# Patient Record
Sex: FEMALE | Race: OTHER | HISPANIC OR LATINO | URBAN - METROPOLITAN AREA
[De-identification: names, ages, dates, MRNs, and addresses within clinical notes are randomized per-mention and may not be internally consistent; named-entity substitution may affect disease eponyms.]

---

## 2020-08-15 ENCOUNTER — INPATIENT (INPATIENT)
Facility: HOSPITAL | Age: 65
LOS: 1 days | Discharge: HOME | DRG: 313 | End: 2020-08-17
Attending: INTERNAL MEDICINE | Admitting: INTERNAL MEDICINE
Payer: MEDICARE

## 2020-08-15 VITALS
OXYGEN SATURATION: 95 % | TEMPERATURE: 98 F | HEART RATE: 89 BPM | DIASTOLIC BLOOD PRESSURE: 91 MMHG | WEIGHT: 169.98 LBS | SYSTOLIC BLOOD PRESSURE: 157 MMHG | HEIGHT: 57 IN | RESPIRATION RATE: 18 BRPM

## 2020-08-15 DIAGNOSIS — I20.8 OTHER FORMS OF ANGINA PECTORIS: ICD-10-CM

## 2020-08-15 DIAGNOSIS — I10 ESSENTIAL (PRIMARY) HYPERTENSION: ICD-10-CM

## 2020-08-15 DIAGNOSIS — R60.0 LOCALIZED EDEMA: ICD-10-CM

## 2020-08-15 DIAGNOSIS — E78.00 PURE HYPERCHOLESTEROLEMIA, UNSPECIFIED: ICD-10-CM

## 2020-08-15 DIAGNOSIS — Z29.9 ENCOUNTER FOR PROPHYLACTIC MEASURES, UNSPECIFIED: ICD-10-CM

## 2020-08-15 DIAGNOSIS — R07.9 CHEST PAIN, UNSPECIFIED: ICD-10-CM

## 2020-08-15 LAB
24R-OH-CALCIDIOL SERPL-MCNC: 22.4 NG/ML — LOW (ref 30–80)
A1C WITH ESTIMATED AVERAGE GLUCOSE RESULT: 5.3 % — SIGNIFICANT CHANGE UP (ref 4–5.6)
ALBUMIN SERPL ELPH-MCNC: 3.7 G/DL — SIGNIFICANT CHANGE UP (ref 3.5–5)
ALP SERPL-CCNC: 116 U/L — SIGNIFICANT CHANGE UP (ref 40–120)
ALT FLD-CCNC: 32 U/L DA — SIGNIFICANT CHANGE UP (ref 10–60)
ANION GAP SERPL CALC-SCNC: 7 MMOL/L — SIGNIFICANT CHANGE UP (ref 5–17)
AST SERPL-CCNC: 24 U/L — SIGNIFICANT CHANGE UP (ref 10–40)
BASOPHILS # BLD AUTO: 0.05 K/UL — SIGNIFICANT CHANGE UP (ref 0–0.2)
BASOPHILS NFR BLD AUTO: 0.5 % — SIGNIFICANT CHANGE UP (ref 0–2)
BILIRUB SERPL-MCNC: 0.5 MG/DL — SIGNIFICANT CHANGE UP (ref 0.2–1.2)
BUN SERPL-MCNC: 12 MG/DL — SIGNIFICANT CHANGE UP (ref 7–18)
CALCIUM SERPL-MCNC: 9.1 MG/DL — SIGNIFICANT CHANGE UP (ref 8.4–10.5)
CHLORIDE SERPL-SCNC: 109 MMOL/L — HIGH (ref 96–108)
CHOLEST SERPL-MCNC: 181 MG/DL — SIGNIFICANT CHANGE UP (ref 10–199)
CK MB BLD-MCNC: 2 % — SIGNIFICANT CHANGE UP (ref 0–3.5)
CK MB CFR SERPL CALC: 2.5 NG/ML — SIGNIFICANT CHANGE UP (ref 0–3.6)
CK SERPL-CCNC: 127 U/L — SIGNIFICANT CHANGE UP (ref 21–215)
CO2 SERPL-SCNC: 26 MMOL/L — SIGNIFICANT CHANGE UP (ref 22–31)
CREAT SERPL-MCNC: 0.72 MG/DL — SIGNIFICANT CHANGE UP (ref 0.5–1.3)
D DIMER BLD IA.RAPID-MCNC: <150 NG/ML DDU — SIGNIFICANT CHANGE UP
EOSINOPHIL # BLD AUTO: 0.19 K/UL — SIGNIFICANT CHANGE UP (ref 0–0.5)
EOSINOPHIL NFR BLD AUTO: 1.9 % — SIGNIFICANT CHANGE UP (ref 0–6)
ESTIMATED AVERAGE GLUCOSE: 105 MG/DL — SIGNIFICANT CHANGE UP (ref 68–114)
GLUCOSE SERPL-MCNC: 106 MG/DL — HIGH (ref 70–99)
HCT VFR BLD CALC: 42.9 % — SIGNIFICANT CHANGE UP (ref 34.5–45)
HDLC SERPL-MCNC: 62 MG/DL — SIGNIFICANT CHANGE UP
HGB BLD-MCNC: 14.5 G/DL — SIGNIFICANT CHANGE UP (ref 11.5–15.5)
IMM GRANULOCYTES NFR BLD AUTO: 0.4 % — SIGNIFICANT CHANGE UP (ref 0–1.5)
LIPID PNL WITH DIRECT LDL SERPL: 84 MG/DL — SIGNIFICANT CHANGE UP
LYMPHOCYTES # BLD AUTO: 2.37 K/UL — SIGNIFICANT CHANGE UP (ref 1–3.3)
LYMPHOCYTES # BLD AUTO: 24 % — SIGNIFICANT CHANGE UP (ref 13–44)
MCHC RBC-ENTMCNC: 31.3 PG — SIGNIFICANT CHANGE UP (ref 27–34)
MCHC RBC-ENTMCNC: 33.8 GM/DL — SIGNIFICANT CHANGE UP (ref 32–36)
MCV RBC AUTO: 92.7 FL — SIGNIFICANT CHANGE UP (ref 80–100)
MONOCYTES # BLD AUTO: 0.6 K/UL — SIGNIFICANT CHANGE UP (ref 0–0.9)
MONOCYTES NFR BLD AUTO: 6.1 % — SIGNIFICANT CHANGE UP (ref 2–14)
NEUTROPHILS # BLD AUTO: 6.61 K/UL — SIGNIFICANT CHANGE UP (ref 1.8–7.4)
NEUTROPHILS NFR BLD AUTO: 67.1 % — SIGNIFICANT CHANGE UP (ref 43–77)
NRBC # BLD: 0 /100 WBCS — SIGNIFICANT CHANGE UP (ref 0–0)
PLATELET # BLD AUTO: 292 K/UL — SIGNIFICANT CHANGE UP (ref 150–400)
POTASSIUM SERPL-MCNC: 3.8 MMOL/L — SIGNIFICANT CHANGE UP (ref 3.5–5.3)
POTASSIUM SERPL-SCNC: 3.8 MMOL/L — SIGNIFICANT CHANGE UP (ref 3.5–5.3)
PROT SERPL-MCNC: 7.9 G/DL — SIGNIFICANT CHANGE UP (ref 6–8.3)
RBC # BLD: 4.63 M/UL — SIGNIFICANT CHANGE UP (ref 3.8–5.2)
RBC # FLD: 12.2 % — SIGNIFICANT CHANGE UP (ref 10.3–14.5)
SARS-COV-2 IGG SERPL QL IA: POSITIVE
SARS-COV-2 IGM SERPL IA-ACNC: 102 INDEX — HIGH
SARS-COV-2 RNA SPEC QL NAA+PROBE: SIGNIFICANT CHANGE UP
SODIUM SERPL-SCNC: 142 MMOL/L — SIGNIFICANT CHANGE UP (ref 135–145)
TOTAL CHOLESTEROL/HDL RATIO MEASUREMENT: 2.9 RATIO — LOW (ref 3.3–7.1)
TRIGL SERPL-MCNC: 177 MG/DL — HIGH (ref 10–149)
TROPONIN I SERPL-MCNC: <0.015 NG/ML — SIGNIFICANT CHANGE UP (ref 0–0.04)
TSH SERPL-MCNC: 2.2 UU/ML — SIGNIFICANT CHANGE UP (ref 0.34–4.82)
VIT B12 SERPL-MCNC: 703 PG/ML — SIGNIFICANT CHANGE UP (ref 232–1245)
WBC # BLD: 9.86 K/UL — SIGNIFICANT CHANGE UP (ref 3.8–10.5)
WBC # FLD AUTO: 9.86 K/UL — SIGNIFICANT CHANGE UP (ref 3.8–10.5)

## 2020-08-15 PROCEDURE — 93010 ELECTROCARDIOGRAM REPORT: CPT | Mod: 76

## 2020-08-15 PROCEDURE — 99285 EMERGENCY DEPT VISIT HI MDM: CPT

## 2020-08-15 PROCEDURE — 93306 TTE W/DOPPLER COMPLETE: CPT | Mod: 26

## 2020-08-15 PROCEDURE — 71045 X-RAY EXAM CHEST 1 VIEW: CPT | Mod: 26

## 2020-08-15 PROCEDURE — 93970 EXTREMITY STUDY: CPT | Mod: 26

## 2020-08-15 RX ORDER — ASPIRIN/CALCIUM CARB/MAGNESIUM 324 MG
81 TABLET ORAL ONCE
Refills: 0 | Status: COMPLETED | OUTPATIENT
Start: 2020-08-15 | End: 2020-08-15

## 2020-08-15 RX ORDER — METOPROLOL TARTRATE 50 MG
12.5 TABLET ORAL
Refills: 0 | Status: DISCONTINUED | OUTPATIENT
Start: 2020-08-15 | End: 2020-08-17

## 2020-08-15 RX ORDER — ATORVASTATIN CALCIUM 80 MG/1
1 TABLET, FILM COATED ORAL
Qty: 0 | Refills: 0 | DISCHARGE

## 2020-08-15 RX ORDER — ASPIRIN/CALCIUM CARB/MAGNESIUM 324 MG
81 TABLET ORAL DAILY
Refills: 0 | Status: DISCONTINUED | OUTPATIENT
Start: 2020-08-16 | End: 2020-08-17

## 2020-08-15 RX ORDER — ERGOCALCIFEROL 1.25 MG/1
1 CAPSULE ORAL
Qty: 0 | Refills: 0 | DISCHARGE

## 2020-08-15 RX ORDER — ATORVASTATIN CALCIUM 80 MG/1
40 TABLET, FILM COATED ORAL AT BEDTIME
Refills: 0 | Status: DISCONTINUED | OUTPATIENT
Start: 2020-08-15 | End: 2020-08-17

## 2020-08-15 RX ORDER — LOSARTAN POTASSIUM 100 MG/1
1 TABLET, FILM COATED ORAL
Qty: 0 | Refills: 0 | DISCHARGE

## 2020-08-15 RX ORDER — LOSARTAN POTASSIUM 100 MG/1
25 TABLET, FILM COATED ORAL DAILY
Refills: 0 | Status: DISCONTINUED | OUTPATIENT
Start: 2020-08-15 | End: 2020-08-17

## 2020-08-15 RX ORDER — AMLODIPINE BESYLATE 2.5 MG/1
2.5 TABLET ORAL DAILY
Refills: 0 | Status: DISCONTINUED | OUTPATIENT
Start: 2020-08-15 | End: 2020-08-17

## 2020-08-15 RX ORDER — ENOXAPARIN SODIUM 100 MG/ML
40 INJECTION SUBCUTANEOUS DAILY
Refills: 0 | Status: DISCONTINUED | OUTPATIENT
Start: 2020-08-15 | End: 2020-08-17

## 2020-08-15 RX ORDER — ATORVASTATIN CALCIUM 80 MG/1
10 TABLET, FILM COATED ORAL AT BEDTIME
Refills: 0 | Status: DISCONTINUED | OUTPATIENT
Start: 2020-08-15 | End: 2020-08-15

## 2020-08-15 RX ORDER — ACETAMINOPHEN 500 MG
650 TABLET ORAL EVERY 6 HOURS
Refills: 0 | Status: DISCONTINUED | OUTPATIENT
Start: 2020-08-15 | End: 2020-08-17

## 2020-08-15 RX ORDER — AMLODIPINE BESYLATE 2.5 MG/1
1 TABLET ORAL
Qty: 0 | Refills: 0 | DISCHARGE

## 2020-08-15 RX ADMIN — Medication 81 MILLIGRAM(S): at 05:43

## 2020-08-15 RX ADMIN — Medication 650 MILLIGRAM(S): at 20:20

## 2020-08-15 RX ADMIN — Medication 12.5 MILLIGRAM(S): at 17:14

## 2020-08-15 RX ADMIN — Medication 650 MILLIGRAM(S): at 19:33

## 2020-08-15 RX ADMIN — LOSARTAN POTASSIUM 25 MILLIGRAM(S): 100 TABLET, FILM COATED ORAL at 06:18

## 2020-08-15 RX ADMIN — ATORVASTATIN CALCIUM 40 MILLIGRAM(S): 80 TABLET, FILM COATED ORAL at 21:55

## 2020-08-15 RX ADMIN — ENOXAPARIN SODIUM 40 MILLIGRAM(S): 100 INJECTION SUBCUTANEOUS at 12:17

## 2020-08-15 NOTE — H&P ADULT - NSHPPHYSICALEXAM_GEN_ALL_CORE
Vital Signs Last 24 Hrs  T(C): 36.4 (15 Aug 2020 06:14), Max: 36.8 (15 Aug 2020 02:30)  T(F): 97.5 (15 Aug 2020 06:14), Max: 98.3 (15 Aug 2020 02:30)  HR: 79 (15 Aug 2020 06:14) (79 - 89)  BP: 148/71 (15 Aug 2020 06:14) (148/71 - 157/91)  BP(mean): --  RR: 18 (15 Aug 2020 06:14) (18 - 18)  SpO2: 96% (15 Aug 2020 06:14) (95% - 96%)      PHYSICAL EXAM:  GENERAL: NAD, well-groomed, well-developed  HEAD:  Atraumatic, Normocephalic  EYES: PERRLA, conjunctiva and sclera clear  ENMT: No tonsillar erythema, exudates, or enlargement; Moist mucous membranes, Good dentition, No lesions  NECK: Supple, No JVD, Normal thyroid  NERVOUS SYSTEM:  Alert & Oriented X3, Good concentration; Motor Strength 5/5 B/L upper and lower extremities  CHEST/LUNG: Clear to percussion bilaterally; (+) b/l crackles, No rhonchi, wheezing, or rubs  HEART: Regular rate and rhythm; No murmurs, rubs, or gallops  ABDOMEN: Obese, Soft, Nontender, Bowel sounds present  EXTREMITIES:  2+ Peripheral Pulses, No clubbing, cyanosis, (+) mild non pitting edema  LYMPH: No lymphadenopathy noted  SKIN: No rashes or lesions

## 2020-08-15 NOTE — H&P ADULT - ASSESSMENT
64yo female from home, living with son, with PMH of HTN, HLD, R ear hearing impaired, no PSH presented to ED for chest pain and palpitation.    In ED:  EKG: low voltage  Chest pain is 5/10 intensity. Pt states pain is better after getting aspirin.    Pt was admitted for ACS work up.    **Please call pharmacy ([facility] [phone]) to confirm pt's home meds.Pt states she is taking 5 meds, but remember only 3 meds. 66yo female from home, living with son, with PMH of HTN, HLD, R ear hearing impaired, no PSH presented to ED for chest pain and palpitation.    In ED:  EKG: NSR, low voltage QRS  Chest pain is 5/10 intensity. Pt states pain is better after getting aspirin.    Pt was admitted for ACS work up.    **Please call pharmacy ([facility] [phone]) to confirm pt's home meds. Pt states she is taking 5 meds, but remember only 3 meds. 64yo Luxembourgish speaking female from home, living with son, with PMH of HTN, HLD, arthritis, R ear hearing impaired, no PSH presented to ED for chest pain and palpitation.    In ED:  EKG: NSR, low voltage QRS  Chest pain is 5/10 intensity. Pt states pain is better after getting aspirin.    Pt was admitted for ACS work up.    **Please call pharmacy ([facility] [phone]) to confirm pt's home meds. Pt states she is taking 5 meds, but remember only 3 meds.

## 2020-08-15 NOTE — H&P ADULT - ATTENDING COMMENTS
66yo Khmer speaking female from home, living with son, with PMH of HTN, HLD, arthritis, R ear hearing impaird, no PSH presented to ED for chest pain and palpitation. On Fri (a day ago), pt had abd pain and not feeling well, then chest pain and palpitation (pt states " tachycardia" ) started. Pain started 1 day ago, throbbing, dull pain, 9/10 intensity, no radiation, on and off, worse with exertion. Pt also had mild headache and she feels like getting more swollen these days. She gained 10lbs in 2 months.  Pt denied SOB, acid reflex, n/v, or any other symptoms.    In ED:  EKG: NSR, low voltage QRS  Chest pain is 5/10 intensity. Pt states pain is better after getting aspirin.    assessment   --- chest pain, uncontrolled htn  r/o acs, h/o HTN, HLD, arthritis, R ear hearing impaired    plan  --  adm to tele, acs protocol, lopressor, aspirin, statin, cont preadmit home meds, gi and dvt profilaxis  cbc, bmp, mg, phos, lipid, tsh, ce q8 x3    echo    cardio cons

## 2020-08-15 NOTE — H&P ADULT - PROBLEM SELECTOR PLAN 4
IMPROVE VTE Individual Risk Assessment  RISK                                                                Points  [  ] Previous VTE                                                  3  [  ] Thrombophilia                                               2  [  ] Lower limb paralysis                                      2        (unable to hold up >15 seconds)    [  ] Current Cancer                                              2         (within 6 months)  [ x ] Immobilization > 24 hrs                                1  [  ] ICU/CCU stay > 24 hours                              1  [x  ] Age > 60                                                      1  IMPROVE VTE Score __2_______  Lovenox for DVT ppx c/w statin  f/u lipid panel

## 2020-08-15 NOTE — H&P ADULT - PROBLEM SELECTOR PLAN 2
Pt is on losartan 25mg and amlodipine pt doesn't remember the dosage  - c/w losartan 25mg and low dose amolodipine  Please call pharmacy to clarify the meds Pt has mild leg edema, has tenderness on palpation  - f/u TTE  - f/u doppler b/l LE

## 2020-08-15 NOTE — CHART NOTE - NSCHARTNOTEFT_GEN_A_CORE
Pt 66 y/o Ghanaian speaking female from home, living with son, with PMH of HTN, HLD, arthritis, R ear hearing impaired, no PSH presented to ED for chest pain and palpitation admitted for R/o ACS work up.  Cardiac enzyme and lower extremities US neg so far.  Pt s/o to floor covering resident. Pt 64 y/o Nigerien speaking female from home, living with son, with PMH of HTN, HLD, arthritis, R ear hearing impaired, no PSH presented to ED for chest pain and palpitation admitted for R/o ACS work up.  Cardiac enzyme and lower extremities US neg so far.  Pt s/o to floor covering resident.    Outpt Med list updated.

## 2020-08-15 NOTE — CONSULT NOTE ADULT - SUBJECTIVE AND OBJECTIVE BOX
CHIEF COMPLAINT:Patient is a 65y old  Female who presents with a chief complaint of Chest pain.      HPI:Pt is a 65 yr old female with PMHX of HTN,LIPID D/O who presents to ER with intermittent chest pain and sob on exertion, no pnd or orthopnea.Currently,pt is chest pain free at present time..      PAST MEDICAL & SURGICAL HISTORY:  Hearing impairment  High cholesterol  Hypertension, unspecified type      MEDICATIONS  (STANDING):  amLODIPine   Tablet 2.5 milliGRAM(s) Oral daily  atorvastatin 40 milliGRAM(s) Oral at bedtime  enoxaparin Injectable 40 milliGRAM(s) SubCutaneous daily  losartan 25 milliGRAM(s) Oral daily  metoprolol tartrate 12.5 milliGRAM(s) Oral two times a day    MEDICATIONS  (PRN):      FAMILY HISTORY:No hx of CAD      SOCIAL HISTORY:    [x ] Non-smoker    [x ] Alcohol-denies    Allergies    No Known Allergies    Intolerances    	    REVIEW OF SYSTEMS:  CONSTITUTIONAL: No fever, weight loss, or fatigue  EYES: No eye pain, visual disturbances, or discharge  ENT:  No difficulty hearing, tinnitus, vertigo; No sinus or throat pain  NECK: No pain or stiffness  RESPIRATORY: No cough, wheezing, chills or hemoptysis; + Shortness of Breath  CARDIOVASCULAR: + chest pain, No palpitations, passing out, dizziness, or leg swelling  GASTROINTESTINAL: No abdominal or epigastric pain. No nausea, vomiting, or hematemesis; No diarrhea or constipation. No melena or hematochezia.  GENITOURINARY: No dysuria, frequency, hematuria, or incontinence  NEUROLOGICAL: No headaches, memory loss, loss of strength, numbness, or tremors  SKIN: No itching, burning, rashes, or lesions   LYMPH Nodes: No enlarged glands  ENDOCRINE: No heat or cold intolerance; No hair loss  MUSCULOSKELETAL: No joint pain or swelling; No muscle, back, or extremity pain  PSYCHIATRIC: No depression, anxiety, mood swings, or difficulty sleeping  HEME/LYMPH: No easy bruising, or bleeding gums  ALLERGY AND IMMUNOLOGIC: No hives or eczema	        PHYSICAL EXAM:  T(C): 36.7 (08-15-20 @ 07:42), Max: 36.8 (08-15-20 @ 02:30)  HR: 68 (08-15-20 @ 07:42) (68 - 89)  BP: 127/61 (08-15-20 @ 07:42) (127/61 - 157/91)  RR: 16 (08-15-20 @ 07:42) (16 - 18)  SpO2: 98% (08-15-20 @ 07:42) (95% - 98%)  Wt(kg): --  I&O's Summary      Appearance: Normal	  HEENT:   Normal oral mucosa, PERRL, EOMI	  Lymphatic: No lymphadenopathy  Cardiovascular: Normal S1 S2, No JVD, No murmurs, No edema  Respiratory: Lungs clear to auscultation	  Psychiatry: A & O x 3, Mood & affect appropriate  Gastrointestinal:  Soft, Non-tender, + BS	  Skin: No rashes, No ecchymoses, No cyanosis	  Neurologic: Non-focal  Extremities: Normal range of motion, No clubbing, cyanosis or edema  Vascular: Peripheral pulses palpable 2+ bilaterally    	    ECG:  	NSR with IRBBB  	  	  LABS:	 	      CARDIAC MARKERS ( 15 Aug 2020 03:08 )  <0.015 ng/mL / x     / x     / x     / x                             14.5   9.86  )-----------( 292      ( 15 Aug 2020 03:08 )             42.9     08-15    142  |  109<H>  |  12  ----------------------------<  106<H>  3.8   |  26  |  0.72    Ca    9.1      15 Aug 2020 03:08  Phos  3.5     08-15  Mg     2.4     08-15    TPro  7.9  /  Alb  3.7  /  TBili  0.5  /  DBili  x   /  AST  24  /  ALT  32  /  AlkPhos  116  08-15    EXAM:  XR CHEST AP OR PA 1V                            PROCEDURE DATE:  08/15/2020          INTERPRETATION:  CLINICAL INFORMATION: Chest pain.    A frontal view of the chest was obtained.    Comparison: None.    IMPRESSION:    The mediastinal cardiac silhouette is unremarkable.    The lungs are clear.    No acute osseous finding.

## 2020-08-15 NOTE — H&P ADULT - NSHPREVIEWOFSYSTEMS_GEN_ALL_CORE
REVIEW OF SYSTEMS:  CONSTITUTIONAL: No fever, weight loss, or fatigue  EYES: No eye pain, visual disturbances, or discharge  ENMT:  No difficulty hearing, tinnitus, vertigo; No sinus or throat pain  NECK: No pain or stiffness  BREASTS: No pain, masses, or nipple discharge  RESPIRATORY: No cough, wheezing, chills or hemoptysis; No shortness of breath  CARDIOVASCULAR: (+) chest pain, palpitations, no dizziness, (+)mild  leg swelling  GASTROINTESTINAL: (+) had abdominal  pain. No nausea, vomiting, or hematemesis; No diarrhea or constipation. No melena or hematochezia.  GENITOURINARY: No dysuria, frequency, hematuria, or incontinence  NEUROLOGICAL: (+) mild headaches, no memory loss, loss of strength, numbness, or tremors  SKIN: No itching, burning, rashes, or lesions   LYMPH NODES: No enlarged glands  ENDOCRINE: No heat or cold intolerance; No hair loss  MUSCULOSKELETAL: No joint pain or swelling; No muscle, back, or extremity pain  HEME/LYMPH: No easy bruising, or bleeding gums  ALLERY AND IMMUNOLOGIC: No hives or eczema

## 2020-08-15 NOTE — H&P ADULT - PROBLEM SELECTOR PLAN 5
IMPROVE VTE Individual Risk Assessment  RISK                                                                Points  [  ] Previous VTE                                                  3  [  ] Thrombophilia                                               2  [  ] Lower limb paralysis                                      2        (unable to hold up >15 seconds)    [  ] Current Cancer                                              2         (within 6 months)  [ x ] Immobilization > 24 hrs                                1  [  ] ICU/CCU stay > 24 hours                              1  [x  ] Age > 60                                                      1  IMPROVE VTE Score __2_______  Lovenox for DVT ppx

## 2020-08-15 NOTE — ED PROVIDER NOTE - OBJECTIVE STATEMENT
Patient reports intermittent exertional chest pain starting in the afternoon on 8/14. No fever, sob, cough, ap, n/v/d, diaphoresis. No pain right now. [employer] used.

## 2020-08-15 NOTE — H&P ADULT - NSHPSOCIALHISTORY_GEN_ALL_CORE
Pt lives at home with her son, not working due to hearing impairment  Pt denied smoking or illicit drug use. Pt drinks alcohol occasionally.

## 2020-08-15 NOTE — ED ADULT NURSE NOTE - CHPI ED NUR SYMPTOMS NEG
no vomiting/no dizziness/no chills/no decreased eating/drinking/no tingling/no nausea/no fever/no weakness

## 2020-08-15 NOTE — H&P ADULT - HISTORY OF PRESENT ILLNESS
66yo female from home, living with son, with PMH of HTN, HLD, R ear hearing impaird, no PSH presented to ED for chest pain and palpitation. On Fri (a day ago), pt had abd pain and not feeling well, then chest pain and palpitation (pt states " tachycardia" ) started. Pain started 1 day ago, throbbing, dull pain, 9/10 intensity, no radiation, on and off, worse with exertion. Pt also had mild headache and she feels like getting more swollen these days. She gained 10lbs in 2 months.  Pt denied SOB, acid reflex, n/v, or any other symptoms.    In ED:  EKG: low voltage  Chest pain is 5/10 intensity. Pt states pain is better after getting aspirin. 64yo female from home, living with son, with PMH of HTN, HLD, R ear hearing impaird, no PSH presented to ED for chest pain and palpitation. On Fri (a day ago), pt had abd pain and not feeling well, then chest pain and palpitation (pt states " tachycardia" ) started. Pain started 1 day ago, throbbing, dull pain, 9/10 intensity, no radiation, on and off, worse with exertion. Pt also had mild headache and she feels like getting more swollen these days. She gained 10lbs in 2 months.  Pt denied SOB, acid reflex, n/v, or any other symptoms.    In ED:  EKG: NSR, low voltage QRS  Chest pain is 5/10 intensity. Pt states pain is better after getting aspirin. 64yo Mongolian speaking female from home, living with son, with PMH of HTN, HLD, arthritis, R ear hearing impaird, no PSH presented to ED for chest pain and palpitation. On Fri (a day ago), pt had abd pain and not feeling well, then chest pain and palpitation (pt states " tachycardia" ) started. Pain started 1 day ago, throbbing, dull pain, 9/10 intensity, no radiation, on and off, worse with exertion. Pt also had mild headache and she feels like getting more swollen these days. She gained 10lbs in 2 months.  Pt denied SOB, acid reflex, n/v, or any other symptoms.    In ED:  EKG: NSR, low voltage QRS  Chest pain is 5/10 intensity. Pt states pain is better after getting aspirin. 64yo Kinyarwanda speaking female from home, living with son, with PMH of HTN, HLD, arthritis, R ear hearing impaired, no PSH presented to ED for chest pain and palpitation.    In ED:  EKG: NSR, low voltage QRS  Chest pain is 5/10 intensity. Pt states pain is better after getting aspirin.

## 2020-08-15 NOTE — CONSULT NOTE ADULT - ASSESSMENT
65 yr old female with PMHX of HTN,LIPID D/O who presents to ER with intermittent chest pain and sob on exertion.  1.Tele monitoring.  2.Echocardiogram.  3.Stress test-r/o ischemia.  4.HTN-cont bp medication.  5.Lipid d/o-statin.  6.GI and DVT prophylaxis.

## 2020-08-15 NOTE — H&P ADULT - PROBLEM SELECTOR PLAN 3
c/w statin  f/u lipid panel Pt is on losartan 25mg and amlodipine pt doesn't remember the dosage  - c/w losartan 25mg and low dose amolodipine  Please call pharmacy to clarify the meds

## 2020-08-15 NOTE — H&P ADULT - PROBLEM SELECTOR PLAN 1
- Patient p/w exertional chest pain   - EKG   - Pt never had cardiac work up   - T1 negative, T2 @ 9am  , T3 @ 3pm  - ELVA score: 1   - c/w ASA 81 mg, beta blocker and High dose statin   - Follow up Lipid profile  - No coffee or tea for possible stress test  - On Tele.  - f/u TTE  ** Cardiologist Consulted Dr. Pimentel - Patient p/w exertional chest pain   - EKG   - Pt never had cardiac work up   - T1 negative, f/u T2 @ 9am  , T3 @ 3pm  - ELVA score: 1   - c/w ASA 81 mg, beta blocker and High dose statin   - Follow up Lipid profile  - No coffee or tea for possible stress test  - On Tele.  - f/u TTE  - f/u D-dimer   ** Cardiologist Consulted Dr. Pimentel

## 2020-08-16 LAB
ANION GAP SERPL CALC-SCNC: 7 MMOL/L — SIGNIFICANT CHANGE UP (ref 5–17)
BASOPHILS # BLD AUTO: 0.06 K/UL — SIGNIFICANT CHANGE UP (ref 0–0.2)
BASOPHILS NFR BLD AUTO: 0.8 % — SIGNIFICANT CHANGE UP (ref 0–2)
BUN SERPL-MCNC: 24 MG/DL — HIGH (ref 7–18)
CALCIUM SERPL-MCNC: 9.3 MG/DL — SIGNIFICANT CHANGE UP (ref 8.4–10.5)
CHLORIDE SERPL-SCNC: 107 MMOL/L — SIGNIFICANT CHANGE UP (ref 96–108)
CO2 SERPL-SCNC: 26 MMOL/L — SIGNIFICANT CHANGE UP (ref 22–31)
CREAT SERPL-MCNC: 0.82 MG/DL — SIGNIFICANT CHANGE UP (ref 0.5–1.3)
EOSINOPHIL # BLD AUTO: 0.22 K/UL — SIGNIFICANT CHANGE UP (ref 0–0.5)
EOSINOPHIL NFR BLD AUTO: 3 % — SIGNIFICANT CHANGE UP (ref 0–6)
GLUCOSE SERPL-MCNC: 91 MG/DL — SIGNIFICANT CHANGE UP (ref 70–99)
HCT VFR BLD CALC: 44.9 % — SIGNIFICANT CHANGE UP (ref 34.5–45)
HCV AB S/CO SERPL IA: 0.26 S/CO — SIGNIFICANT CHANGE UP (ref 0–0.99)
HCV AB SERPL-IMP: SIGNIFICANT CHANGE UP
HGB BLD-MCNC: 15 G/DL — SIGNIFICANT CHANGE UP (ref 11.5–15.5)
IMM GRANULOCYTES NFR BLD AUTO: 0.4 % — SIGNIFICANT CHANGE UP (ref 0–1.5)
LYMPHOCYTES # BLD AUTO: 2.64 K/UL — SIGNIFICANT CHANGE UP (ref 1–3.3)
LYMPHOCYTES # BLD AUTO: 36.4 % — SIGNIFICANT CHANGE UP (ref 13–44)
MAGNESIUM SERPL-MCNC: 2.6 MG/DL — SIGNIFICANT CHANGE UP (ref 1.6–2.6)
MCHC RBC-ENTMCNC: 31.3 PG — SIGNIFICANT CHANGE UP (ref 27–34)
MCHC RBC-ENTMCNC: 33.4 GM/DL — SIGNIFICANT CHANGE UP (ref 32–36)
MCV RBC AUTO: 93.7 FL — SIGNIFICANT CHANGE UP (ref 80–100)
MONOCYTES # BLD AUTO: 0.63 K/UL — SIGNIFICANT CHANGE UP (ref 0–0.9)
MONOCYTES NFR BLD AUTO: 8.7 % — SIGNIFICANT CHANGE UP (ref 2–14)
NEUTROPHILS # BLD AUTO: 3.67 K/UL — SIGNIFICANT CHANGE UP (ref 1.8–7.4)
NEUTROPHILS NFR BLD AUTO: 50.7 % — SIGNIFICANT CHANGE UP (ref 43–77)
NRBC # BLD: 0 /100 WBCS — SIGNIFICANT CHANGE UP (ref 0–0)
PHOSPHATE SERPL-MCNC: 4.8 MG/DL — HIGH (ref 2.5–4.5)
PLATELET # BLD AUTO: 309 K/UL — SIGNIFICANT CHANGE UP (ref 150–400)
POTASSIUM SERPL-MCNC: 4.1 MMOL/L — SIGNIFICANT CHANGE UP (ref 3.5–5.3)
POTASSIUM SERPL-SCNC: 4.1 MMOL/L — SIGNIFICANT CHANGE UP (ref 3.5–5.3)
RBC # BLD: 4.79 M/UL — SIGNIFICANT CHANGE UP (ref 3.8–5.2)
RBC # FLD: 12.3 % — SIGNIFICANT CHANGE UP (ref 10.3–14.5)
SODIUM SERPL-SCNC: 140 MMOL/L — SIGNIFICANT CHANGE UP (ref 135–145)
WBC # BLD: 7.25 K/UL — SIGNIFICANT CHANGE UP (ref 3.8–10.5)
WBC # FLD AUTO: 7.25 K/UL — SIGNIFICANT CHANGE UP (ref 3.8–10.5)

## 2020-08-16 RX ORDER — CHOLECALCIFEROL (VITAMIN D3) 125 MCG
1000 CAPSULE ORAL DAILY
Refills: 0 | Status: DISCONTINUED | OUTPATIENT
Start: 2020-08-16 | End: 2020-08-17

## 2020-08-16 RX ADMIN — Medication 12.5 MILLIGRAM(S): at 06:22

## 2020-08-16 RX ADMIN — Medication 12.5 MILLIGRAM(S): at 17:08

## 2020-08-16 RX ADMIN — LOSARTAN POTASSIUM 25 MILLIGRAM(S): 100 TABLET, FILM COATED ORAL at 06:23

## 2020-08-16 RX ADMIN — Medication 650 MILLIGRAM(S): at 19:28

## 2020-08-16 RX ADMIN — ENOXAPARIN SODIUM 40 MILLIGRAM(S): 100 INJECTION SUBCUTANEOUS at 11:26

## 2020-08-16 RX ADMIN — ATORVASTATIN CALCIUM 40 MILLIGRAM(S): 80 TABLET, FILM COATED ORAL at 22:11

## 2020-08-16 RX ADMIN — Medication 81 MILLIGRAM(S): at 11:26

## 2020-08-16 RX ADMIN — Medication 1000 UNIT(S): at 22:11

## 2020-08-16 RX ADMIN — Medication 650 MILLIGRAM(S): at 20:09

## 2020-08-16 RX ADMIN — AMLODIPINE BESYLATE 2.5 MILLIGRAM(S): 2.5 TABLET ORAL at 06:23

## 2020-08-16 NOTE — PROGRESS NOTE ADULT - PROBLEM SELECTOR PLAN 3
- Pt is on losartan 25mg and amlodipine pt doesn't remember the dosage  - c/w losartan 25mg and low dose amolodipine  - need to verify with pharmacy to clarify the meds

## 2020-08-16 NOTE — PROGRESS NOTE ADULT - SUBJECTIVE AND OBJECTIVE BOX
Patient is a 65y old  Female who presents with a chief complaint of Chest pain (15 Aug 2020 09:26)    pt seen in icu [  ], reg med floor [   ], bed [  ], chair at bedside [   ], a+o x3 [  ], lethargic [  ],  nad [  ]    martin [  ], ngt [  ], peg [  ], et tube [  ], cent line [  ], picc line [  ]        Allergies    No Known Allergies        Vitals    T(F): 98.5 (08-16-20 @ 04:56), Max: 98.8 (08-15-20 @ 23:51)  HR: 62 (08-16-20 @ 04:56) (62 - 79)  BP: 116/73 (08-16-20 @ 04:56) (107/60 - 148/71)  RR: 18 (08-16-20 @ 04:56) (16 - 18)  SpO2: 97% (08-16-20 @ 04:56) (96% - 99%)  Wt(kg): --  CAPILLARY BLOOD GLUCOSE          Labs                          14.5   9.86  )-----------( 292      ( 15 Aug 2020 03:08 )             42.9       08-15    142  |  109<H>  |  12  ----------------------------<  106<H>  3.8   |  26  |  0.72    Ca    9.1      15 Aug 2020 03:08  Phos  3.5     08-15  Mg     2.4     08-15    TPro  7.9  /  Alb  3.7  /  TBili  0.5  /  DBili  x   /  AST  24  /  ALT  32  /  AlkPhos  116  08-15      CARDIAC MARKERS ( 15 Aug 2020 16:57 )  <0.015 ng/mL / x     / x     / x     / x      CARDIAC MARKERS ( 15 Aug 2020 09:52 )  <0.015 ng/mL / x     / 127 U/L / x     / 2.5 ng/mL  CARDIAC MARKERS ( 15 Aug 2020 03:08 )  <0.015 ng/mL / x     / x     / x     / x                Radiology Results      Meds    MEDICATIONS  (STANDING):  amLODIPine   Tablet 2.5 milliGRAM(s) Oral daily  aspirin enteric coated 81 milliGRAM(s) Oral daily  atorvastatin 40 milliGRAM(s) Oral at bedtime  enoxaparin Injectable 40 milliGRAM(s) SubCutaneous daily  losartan 25 milliGRAM(s) Oral daily  metoprolol tartrate 12.5 milliGRAM(s) Oral two times a day      MEDICATIONS  (PRN):  acetaminophen   Tablet .. 650 milliGRAM(s) Oral every 6 hours PRN Mild Pain (1 - 3), Moderate Pain (4 - 6)      Physical Exam    Neuro :  no focal deficits  Respiratory: CTA B/L  CV: RRR, S1S2, no murmurs,   Abdominal: Soft, NT, ND +BS,  Extremities: No edema, + peripheral pulses    ASSESSMENT    Chest pain  Hearing impairment  High cholesterol  Hypertension, unspecified type      PLAN Patient is a 65y old  Female who presents with a chief complaint of Chest pain (15 Aug 2020 09:26)    pt seen in tele [ x ], reg med floor [   ], bed [ x ], chair at bedside [   ], a+o x3 [ x ], lethargic [  ],  nad [ x ]    no further c/o cp      Allergies    No Known Allergies        Vitals    T(F): 98.5 (08-16-20 @ 04:56), Max: 98.8 (08-15-20 @ 23:51)  HR: 62 (08-16-20 @ 04:56) (62 - 79)  BP: 116/73 (08-16-20 @ 04:56) (107/60 - 148/71)  RR: 18 (08-16-20 @ 04:56) (16 - 18)  SpO2: 97% (08-16-20 @ 04:56) (96% - 99%)  Wt(kg): --  CAPILLARY BLOOD GLUCOSE          Labs                          14.5   9.86  )-----------( 292      ( 15 Aug 2020 03:08 )             42.9       08-15    142  |  109<H>  |  12  ----------------------------<  106<H>  3.8   |  26  |  0.72    Ca    9.1      15 Aug 2020 03:08  Phos  3.5     08-15  Mg     2.4     08-15    TPro  7.9  /  Alb  3.7  /  TBili  0.5  /  DBili  x   /  AST  24  /  ALT  32  /  AlkPhos  116  08-15      CARDIAC MARKERS ( 15 Aug 2020 16:57 )  <0.015 ng/mL / x     / x     / x     / x      CARDIAC MARKERS ( 15 Aug 2020 09:52 )  <0.015 ng/mL / x     / 127 U/L / x     / 2.5 ng/mL  CARDIAC MARKERS ( 15 Aug 2020 03:08 )  <0.015 ng/mL / x     / x     / x     / x          < from: Transthoracic Echocardiogram (08.15.20 @ 07:44) >  CONCLUSIONS:  1. Normal mitral valve. No mitral regurgitation is noted.  2. Aortic valve not well visualized. No aortic stenosis. No  aortic valve regurgitation seen.  3. Normal aortic root.  4. Normal left atrium.  5. Normal left ventricular internal dimensions and wall  thicknesses.  6. Normal Left Ventricular Systolic Function,  (EF = 55 to  60%)  7. Normal diastolic function.  8. Normal right atrium.  9. Normal right ventricular size and systolic function  (TAPSE 2.5 cm).  10. RV systolic pressure is normal at  31 mm Hg.  11. Normal tricuspid valve. Trace tricuspid regurgitation.  12. Pulmonic valve not well seen. Trace pulmonic  insufficiency is noted.  13. No pericardial effusion.    < end of copied text >        Radiology Results      Meds    MEDICATIONS  (STANDING):  amLODIPine   Tablet 2.5 milliGRAM(s) Oral daily  aspirin enteric coated 81 milliGRAM(s) Oral daily  atorvastatin 40 milliGRAM(s) Oral at bedtime  enoxaparin Injectable 40 milliGRAM(s) SubCutaneous daily  losartan 25 milliGRAM(s) Oral daily  metoprolol tartrate 12.5 milliGRAM(s) Oral two times a day      MEDICATIONS  (PRN):  acetaminophen   Tablet .. 650 milliGRAM(s) Oral every 6 hours PRN Mild Pain (1 - 3), Moderate Pain (4 - 6)      Physical Exam    Neuro :  no focal deficits  Respiratory: CTA B/L  CV: RRR, S1S2, no murmurs,   Abdominal: Soft, NT, ND +BS,  Extremities: No edema, + peripheral pulses    ASSESSMENT    Chest pain   uncontrolled htn    r/o acs,   h/o HTN,   HLD,   arthritis,   R ear hearing impaired        PLAN      cont tele,   acs protocol,   cont lopressor, aspirin, statin,   ce q8 x3 neg noted above  cardiof/u  echo with EF = 55 to 60% noted above  f/u stress test in am   bp better controlled  cont current meds

## 2020-08-16 NOTE — PROGRESS NOTE ADULT - PROBLEM SELECTOR PLAN 4
c/w statin  - Total cholesterol: 181, Triglycerides: 177, HDL: 62, LDL: 84, Total Cholesterol/HDL: 2.9

## 2020-08-16 NOTE — PROGRESS NOTE ADULT - PROBLEM SELECTOR PLAN 1
- Patient p/w exertional chest pain   - EKG: Line Sinus rhythm with Premature atrial complexes with Aberrant conduction or  Premature ventricular complexes, Low voltage QRS, Incomplete right bundle branch block  - Pt never had cardiac work up   - Troponin negative x 3  - ELVA score: 1   - c/w ASA 81 mg, beta blocker and High dose statin   - Follow up Lipid profile  - No coffee or tea for possible stress test  - On Tele.  - TTE: normal echo, EF 55-60%  - D-dimer: < 150  - Stress test tomorrow  ** Cardiologist Consulted Dr. Pimentel

## 2020-08-16 NOTE — PROGRESS NOTE ADULT - ASSESSMENT
65 yr old female with PMHX of HTN,LIPID D/O who presents to ER with intermittent chest pain and sob on exertion.  1.Tele monitoring.  2.Stress test-r/o ischemia.  3.HTN-cont bp medication.  4.Lipid d/o-statin.  5.GI and DVT prophylaxis.

## 2020-08-16 NOTE — PROGRESS NOTE ADULT - SUBJECTIVE AND OBJECTIVE BOX
PGY-1 Progress Note discussed with attending    PAGER #: [[phone]] TILL 5:00 PM  PLEASE CONTACT ON CALL TEAM:  - On Call Team (Please refer to Ramón) FROM 5:00 PM - 8:30PM  - Nightfloat Team FROM 8:30 -7:30 AM    CHIEF COMPLAINT & BRIEF HOSPITAL COURSE:      INTERVAL HPI/OVERNIGHT EVENTS:       REVIEW OF SYSTEMS:  CONSTITUTIONAL: No fever, weight loss, or fatigue  RESPIRATORY: No cough, wheezing, chills or hemoptysis; No shortness of breath  CARDIOVASCULAR: No chest pain, palpitations, dizziness, or leg swelling  GASTROINTESTINAL: No abdominal pain. No nausea, vomiting, or hematemesis; No diarrhea or constipation. No melena or hematochezia.  GENITOURINARY: No dysuria or hematuria, urinary frequency  MUSCULOSKELETAL: No pain, no Limited ROM  NEUROLOGICAL: No headaches, memory loss, loss of strength, numbness, or tremors  SKIN: No itching, burning, rashes, or lesions     MEDICATIONS  (STANDING):  amLODIPine   Tablet 2.5 milliGRAM(s) Oral daily  aspirin enteric coated 81 milliGRAM(s) Oral daily  atorvastatin 40 milliGRAM(s) Oral at bedtime  enoxaparin Injectable 40 milliGRAM(s) SubCutaneous daily  losartan 25 milliGRAM(s) Oral daily  metoprolol tartrate 12.5 milliGRAM(s) Oral two times a day    MEDICATIONS  (PRN):  acetaminophen   Tablet .. 650 milliGRAM(s) Oral every 6 hours PRN Mild Pain (1 - 3), Moderate Pain (4 - 6)      Vital Signs Last 24 Hrs  T(C): 36.9 (16 Aug 2020 15:57), Max: 37.1 (15 Aug 2020 23:51)  T(F): 98.5 (16 Aug 2020 15:57), Max: 98.8 (15 Aug 2020 23:51)  HR: 62 (16 Aug 2020 15:57) (54 - 69)  BP: 138/65 (16 Aug 2020 15:57) (100/42 - 138/74)  BP(mean): --  RR: 17 (16 Aug 2020 15:57) (17 - 18)  SpO2: 96% (16 Aug 2020 15:57) (96% - 98%)    PHYSICAL EXAMINATION:  GENERAL: NAD, well built  HEAD:  Atraumatic, Normocephalic  EYES:  conjunctiva and sclera clear  NECK: Supple, No JVD, Normal thyroid  CHEST/LUNG: Clear to auscultation. Clear to percussion bilaterally; No rales, rhonchi, wheezing, or rubs  HEART: Regular rate and rhythm; No murmurs, rubs, or gallops  ABDOMEN: Soft, Nontender, Nondistended; Bowel sounds present  NERVOUS SYSTEM:  Alert & Oriented X3,    EXTREMITIES:  2+ Peripheral Pulses, No clubbing, cyanosis, or edema  SKIN: warm dry                          15.0   7.25  )-----------( 309      ( 16 Aug 2020 06:26 )             44.9     08-16    140  |  107  |  24<H>  ----------------------------<  91  4.1   |  26  |  0.82    Ca    9.3      16 Aug 2020 06:26  Phos  4.8     08-16  Mg     2.6     08-16    TPro  7.9  /  Alb  3.7  /  TBili  0.5  /  DBili  x   /  AST  24  /  ALT  32  /  AlkPhos  116  08-15    LIVER FUNCTIONS - ( 15 Aug 2020 03:08 )  Alb: 3.7 g/dL / Pro: 7.9 g/dL / ALK PHOS: 116 U/L / ALT: 32 U/L DA / AST: 24 U/L / GGT: x           CARDIAC MARKERS ( 15 Aug 2020 16:57 )  <0.015 ng/mL / x     / x     / x     / x      CARDIAC MARKERS ( 15 Aug 2020 09:52 )  <0.015 ng/mL / x     / 127 U/L / x     / 2.5 ng/mL  CARDIAC MARKERS ( 15 Aug 2020 03:08 )  <0.015 ng/mL / x     / x     / x     / x            I&O's Summary        RADIOLOGY & ADDITIONAL TESTS:

## 2020-08-16 NOTE — PROGRESS NOTE ADULT - ASSESSMENT
PGY-1 Progress Note discussed with attending    PAGER #: [[phone]] TILL 5:00 PM  PLEASE CONTACT ON CALL TEAM:  - On Call Team (Please refer to Ramón) FROM 5:00 PM - 8:30PM  - Nightfloat Team FROM 8:30 -7:30 AM    CHIEF COMPLAINT & BRIEF HOSPITAL COURSE:  64yo Algerian speaking female from home, living with son, with PMH of HTN, HLD, arthritis, R ear hearing impaird, no PSH presented to ED for chest pain and palpitation. On Fri (a day ago), pt had abd pain and not feeling well, then chest pain and palpitation (pt states " tachycardia" ) started. Pain started 1 day ago, throbbing, dull pain, 9/10 intensity, no radiation, on and off, worse with exertion. Pt also had mild headache and she feels like getting more swollen these days. She gained 10lbs in 2 months.  Pt denied SOB, acid reflex, n/v, or any other symptoms.      INTERVAL HPI/OVERNIGHT EVENTS:   Patient was seen and examined at bedside, mentions she doesn't have any chest pain or palpitations anymore. She is laying comfortably in bed, her only complaint is mild jaw pain when she eats, not at rest. Denies any N/V/D, abdominal pain, chest pain, palpitaitons, SOB, cough, dizzines, headaches, numbness, or tingling.     REVIEW OF SYSTEMS:  CONSTITUTIONAL: No fever, weight loss, or fatigue  RESPIRATORY: No cough, wheezing, chills or hemoptysis; No shortness of breath  CARDIOVASCULAR: No chest pain, palpitations, dizziness, or leg swelling  GASTROINTESTINAL: No abdominal pain. No nausea, vomiting, or hematemesis; No diarrhea or constipation. No melena or hematochezia.  GENITOURINARY: No dysuria or hematuria, urinary frequency  MUSCULOSKELETAL: No pain, no Limited ROM  NEUROLOGICAL: No headaches, memory loss, loss of strength, numbness, or tremors  SKIN: No itching, burning, rashes, or lesions     MEDICATIONS  (STANDING):  amLODIPine   Tablet 2.5 milliGRAM(s) Oral daily  aspirin enteric coated 81 milliGRAM(s) Oral daily  atorvastatin 40 milliGRAM(s) Oral at bedtime  enoxaparin Injectable 40 milliGRAM(s) SubCutaneous daily  losartan 25 milliGRAM(s) Oral daily  metoprolol tartrate 12.5 milliGRAM(s) Oral two times a day    MEDICATIONS  (PRN):  acetaminophen   Tablet .. 650 milliGRAM(s) Oral every 6 hours PRN Mild Pain (1 - 3), Moderate Pain (4 - 6)      Vital Signs Last 24 Hrs  T(C): 36.9 (16 Aug 2020 15:57), Max: 37.1 (15 Aug 2020 23:51)  T(F): 98.5 (16 Aug 2020 15:57), Max: 98.8 (15 Aug 2020 23:51)  HR: 62 (16 Aug 2020 15:57) (54 - 69)  BP: 138/65 (16 Aug 2020 15:57) (100/42 - 138/74)  BP(mean): --  RR: 17 (16 Aug 2020 15:57) (17 - 18)  SpO2: 96% (16 Aug 2020 15:57) (96% - 98%)    PHYSICAL EXAMINATION:  GENERAL: NAD, well built  HEAD:  Atraumatic, Normocephalic  EYES:  conjunctiva and sclera clear  NECK: Supple, No JVD, Normal thyroid  CHEST/LUNG: Clear to auscultation. Clear to percussion bilaterally; No rales, rhonchi, wheezing, or rubs  HEART: Regular rate and rhythm; No murmurs, rubs, or gallops  ABDOMEN: Soft, Nontender, Nondistended; Bowel sounds present  NERVOUS SYSTEM:  Alert & Oriented X3,    EXTREMITIES:  2+ Peripheral Pulses, No clubbing, cyanosis, or edema  SKIN: warm dry                          15.0   7.25  )-----------( 309      ( 16 Aug 2020 06:26 )             44.9     08-16    140  |  107  |  24<H>  ----------------------------<  91  4.1   |  26  |  0.82    Ca    9.3      16 Aug 2020 06:26  Phos  4.8     08-16  Mg     2.6     08-16    TPro  7.9  /  Alb  3.7  /  TBili  0.5  /  DBili  x   /  AST  24  /  ALT  32  /  AlkPhos  116  08-15    LIVER FUNCTIONS - ( 15 Aug 2020 03:08 )  Alb: 3.7 g/dL / Pro: 7.9 g/dL / ALK PHOS: 116 U/L / ALT: 32 U/L DA / AST: 24 U/L / GGT: x           CARDIAC MARKERS ( 15 Aug 2020 16:57 )  <0.015 ng/mL / x     / x     / x     / x      CARDIAC MARKERS ( 15 Aug 2020 09:52 )  <0.015 ng/mL / x     / 127 U/L / x     / 2.5 ng/mL  CARDIAC MARKERS ( 15 Aug 2020 03:08 )  <0.015 ng/mL / x     / x     / x     / x            I&O's Summary        RADIOLOGY & ADDITIONAL TESTS:  < from: 12 Lead ECG (08.15.20 @ 02:41) >  Diagnosis Line Sinus rhythm with Premature atrial complexes with Aberrant conduction or  Premature ventricular complexes  Low voltage QRS  Incomplete right bundle branch block  Borderline ECG    < end of copied text >  < from: Transthoracic Echocardiogram (08.15.20 @ 07:44) >  CONCLUSIONS:  1. Normal mitral valve. No mitral regurgitation is noted.  2. Aortic valve not well visualized. No aortic stenosis. No  aortic valve regurgitation seen.  3. Normal aortic root.  4. Normal left atrium.  5. Normal left ventricular internal dimensions and wall  thicknesses.  6. Normal Left Ventricular Systolic Function,  (EF = 55 to  60%)  7. Normal diastolic function.  8. Normal right atrium.  9. Normal right ventricular size and systolic function  (TAPSE 2.5 cm).  10. RV systolic pressure is normal at  31 mm Hg.  11. Normal tricuspid valve. Trace tricuspid regurgitation.  12. Pulmonic valve not well seen. Trace pulmonic  insufficiency is noted.  13. No pericardial effusion.    < end of copied text >    < from: US Duplex Venous Lower Ext Complete, Bilateral (08.15.20 @ 12:13) >    IMPRESSION:  No evidence of deep venous thrombosis in either lower extremity.    < end of copied text >

## 2020-08-16 NOTE — PROGRESS NOTE ADULT - SUBJECTIVE AND OBJECTIVE BOX
CHIEF COMPLAINT:Patient is a 65y old  Female who presents with a chief complaint of Chest pain.Pt appears comfortable.    	  REVIEW OF SYSTEMS:  CONSTITUTIONAL: No fever, weight loss, or fatigue  EYES: No eye pain, visual disturbances, or discharge  ENT:  No difficulty hearing, tinnitus, vertigo; No sinus or throat pain  NECK: No pain or stiffness  RESPIRATORY: No cough, wheezing, chills or hemoptysis; No Shortness of Breath  CARDIOVASCULAR: No chest pain, palpitations, passing out, dizziness, or leg swelling  GASTROINTESTINAL: No abdominal or epigastric pain. No nausea, vomiting, or hematemesis; No diarrhea or constipation. No melena or hematochezia.  GENITOURINARY: No dysuria, frequency, hematuria, or incontinence  NEUROLOGICAL: No headaches, memory loss, loss of strength, numbness, or tremors  SKIN: No itching, burning, rashes, or lesions   LYMPH Nodes: No enlarged glands  ENDOCRINE: No heat or cold intolerance; No hair loss  MUSCULOSKELETAL: No joint pain or swelling; No muscle, back, or extremity pain  PSYCHIATRIC: No depression, anxiety, mood swings, or difficulty sleeping  HEME/LYMPH: No easy bruising, or bleeding gums  ALLERGY AND IMMUNOLOGIC: No hives or eczema	      PHYSICAL EXAM:  T(C): 36.9 (08-16-20 @ 08:07), Max: 37.1 (08-15-20 @ 23:51)  HR: 54 (08-16-20 @ 08:07) (54 - 71)  BP: 100/42 (08-16-20 @ 08:07) (100/42 - 138/74)  RR: 18 (08-16-20 @ 08:07) (17 - 18)  SpO2: 98% (08-16-20 @ 08:07) (96% - 99%)        Appearance: Normal	  HEENT:   Normal oral mucosa, PERRL, EOMI	  Lymphatic: No lymphadenopathy  Cardiovascular: Normal S1 S2, No JVD, No murmurs, No edema  Respiratory: Lungs clear to auscultation	  Psychiatry: A & O x 3, Mood & affect appropriate  Gastrointestinal:  Soft, Non-tender, + BS	  Skin: No rashes, No ecchymoses, No cyanosis	  Neurologic: Non-focal  Extremities: Normal range of motion, No clubbing, cyanosis or edema  Vascular: Peripheral pulses palpable 2+ bilaterally    MEDICATIONS  (STANDING):  amLODIPine   Tablet 2.5 milliGRAM(s) Oral daily  aspirin enteric coated 81 milliGRAM(s) Oral daily  atorvastatin 40 milliGRAM(s) Oral at bedtime  enoxaparin Injectable 40 milliGRAM(s) SubCutaneous daily  losartan 25 milliGRAM(s) Oral daily  metoprolol tartrate 12.5 milliGRAM(s) Oral two times a day	  	  LABS:	 	      CARDIAC MARKERS ( 15 Aug 2020 16:57 )  <0.015 ng/mL / x     / x     / x     / x      CARDIAC MARKERS ( 15 Aug 2020 09:52 )  <0.015 ng/mL / x     / 127 U/L / x     / 2.5 ng/mL  CARDIAC MARKERS ( 15 Aug 2020 03:08 )  <0.015 ng/mL / x     / x     / x     / x                                    15.0   7.25  )-----------( 309      ( 16 Aug 2020 06:26 )             44.9     08-16    140  |  107  |  24<H>  ----------------------------<  91  4.1   |  26  |  0.82    Ca    9.3      16 Aug 2020 06:26  Phos  4.8     08-16  Mg     2.6     08-16    TPro  7.9  /  Alb  3.7  /  TBili  0.5  /  DBili  x   /  AST  24  /  ALT  32  /  AlkPhos  116  08-15      Lipid Profile: Cholesterol 181  LDL 84  HDL 62        TSH: Thyroid Stimulating Hormone, Serum: 2.20 uU/mL (08-15 @ 09:52)      	  OBSERVATIONS:  Mitral Valve: Normal mitral valve. No mitral regurgitation  is noted.  Aortic Root: Normal aortic root.  Aortic Valve: Aortic valve not well visualized. No aortic  stenosis. No aortic valve regurgitation seen.  Left Atrium: Normal left atrium.  LA volume index = 29  cc/m2.  Left Ventricle: Normal Left Ventricular Systolic Function,  (EF = 55 to 60%) Not all LV wall segments were seen. Normal  left ventricular internal dimensions and wall thicknesses.  Normal diastolic function.  Right Heart: Normal right atrium. Normal right ventricular  size and systolic function (TAPSE 2.5 cm). Normal tricuspid  valve. Trace tricuspid regurgitation. Pulmonic valve not  well seen. Trace pulmonic insufficiency is noted.  Pericardium/PleuraNo pericardial effusion.  Hemodynamic: RA Pressure is 3 mm Hg. RV systolic pressure  is normal at  31 mm Hg.

## 2020-08-16 NOTE — PROGRESS NOTE ADULT - PROBLEM SELECTOR PLAN 2
Pt has mild leg edema, has tenderness on palpation  - TTE: see above  - doppler b/l LE: no DVT noted

## 2020-08-17 VITALS
HEART RATE: 65 BPM | WEIGHT: 172.18 LBS | DIASTOLIC BLOOD PRESSURE: 71 MMHG | OXYGEN SATURATION: 96 % | TEMPERATURE: 98 F | RESPIRATION RATE: 18 BRPM | SYSTOLIC BLOOD PRESSURE: 112 MMHG

## 2020-08-17 LAB
ANION GAP SERPL CALC-SCNC: 6 MMOL/L — SIGNIFICANT CHANGE UP (ref 5–17)
BASOPHILS # BLD AUTO: 0.05 K/UL — SIGNIFICANT CHANGE UP (ref 0–0.2)
BASOPHILS NFR BLD AUTO: 0.7 % — SIGNIFICANT CHANGE UP (ref 0–2)
BUN SERPL-MCNC: 22 MG/DL — HIGH (ref 7–18)
CALCIUM SERPL-MCNC: 8.9 MG/DL — SIGNIFICANT CHANGE UP (ref 8.4–10.5)
CHLORIDE SERPL-SCNC: 106 MMOL/L — SIGNIFICANT CHANGE UP (ref 96–108)
CO2 SERPL-SCNC: 26 MMOL/L — SIGNIFICANT CHANGE UP (ref 22–31)
CREAT SERPL-MCNC: 0.69 MG/DL — SIGNIFICANT CHANGE UP (ref 0.5–1.3)
EOSINOPHIL # BLD AUTO: 0.23 K/UL — SIGNIFICANT CHANGE UP (ref 0–0.5)
EOSINOPHIL NFR BLD AUTO: 3.2 % — SIGNIFICANT CHANGE UP (ref 0–6)
GLUCOSE SERPL-MCNC: 94 MG/DL — SIGNIFICANT CHANGE UP (ref 70–99)
HCT VFR BLD CALC: 43.6 % — SIGNIFICANT CHANGE UP (ref 34.5–45)
HGB BLD-MCNC: 14.2 G/DL — SIGNIFICANT CHANGE UP (ref 11.5–15.5)
IMM GRANULOCYTES NFR BLD AUTO: 0.4 % — SIGNIFICANT CHANGE UP (ref 0–1.5)
LYMPHOCYTES # BLD AUTO: 2.62 K/UL — SIGNIFICANT CHANGE UP (ref 1–3.3)
LYMPHOCYTES # BLD AUTO: 36.1 % — SIGNIFICANT CHANGE UP (ref 13–44)
MAGNESIUM SERPL-MCNC: 2.4 MG/DL — SIGNIFICANT CHANGE UP (ref 1.6–2.6)
MCHC RBC-ENTMCNC: 30.9 PG — SIGNIFICANT CHANGE UP (ref 27–34)
MCHC RBC-ENTMCNC: 32.6 GM/DL — SIGNIFICANT CHANGE UP (ref 32–36)
MCV RBC AUTO: 95 FL — SIGNIFICANT CHANGE UP (ref 80–100)
MONOCYTES # BLD AUTO: 0.63 K/UL — SIGNIFICANT CHANGE UP (ref 0–0.9)
MONOCYTES NFR BLD AUTO: 8.7 % — SIGNIFICANT CHANGE UP (ref 2–14)
NEUTROPHILS # BLD AUTO: 3.69 K/UL — SIGNIFICANT CHANGE UP (ref 1.8–7.4)
NEUTROPHILS NFR BLD AUTO: 50.9 % — SIGNIFICANT CHANGE UP (ref 43–77)
NRBC # BLD: 0 /100 WBCS — SIGNIFICANT CHANGE UP (ref 0–0)
PHOSPHATE SERPL-MCNC: 3.9 MG/DL — SIGNIFICANT CHANGE UP (ref 2.5–4.5)
PLATELET # BLD AUTO: 318 K/UL — SIGNIFICANT CHANGE UP (ref 150–400)
POTASSIUM SERPL-MCNC: 4 MMOL/L — SIGNIFICANT CHANGE UP (ref 3.5–5.3)
POTASSIUM SERPL-SCNC: 4 MMOL/L — SIGNIFICANT CHANGE UP (ref 3.5–5.3)
RBC # BLD: 4.59 M/UL — SIGNIFICANT CHANGE UP (ref 3.8–5.2)
RBC # FLD: 12.4 % — SIGNIFICANT CHANGE UP (ref 10.3–14.5)
SODIUM SERPL-SCNC: 138 MMOL/L — SIGNIFICANT CHANGE UP (ref 135–145)
WBC # BLD: 7.25 K/UL — SIGNIFICANT CHANGE UP (ref 3.8–10.5)
WBC # FLD AUTO: 7.25 K/UL — SIGNIFICANT CHANGE UP (ref 3.8–10.5)

## 2020-08-17 PROCEDURE — 82553 CREATINE MB FRACTION: CPT

## 2020-08-17 PROCEDURE — 93306 TTE W/DOPPLER COMPLETE: CPT

## 2020-08-17 PROCEDURE — 36415 COLL VENOUS BLD VENIPUNCTURE: CPT

## 2020-08-17 PROCEDURE — 86803 HEPATITIS C AB TEST: CPT

## 2020-08-17 PROCEDURE — 85379 FIBRIN DEGRADATION QUANT: CPT

## 2020-08-17 PROCEDURE — 83735 ASSAY OF MAGNESIUM: CPT

## 2020-08-17 PROCEDURE — 99285 EMERGENCY DEPT VISIT HI MDM: CPT | Mod: 25

## 2020-08-17 PROCEDURE — 84484 ASSAY OF TROPONIN QUANT: CPT

## 2020-08-17 PROCEDURE — 86769 SARS-COV-2 COVID-19 ANTIBODY: CPT

## 2020-08-17 PROCEDURE — 80061 LIPID PANEL: CPT

## 2020-08-17 PROCEDURE — 71045 X-RAY EXAM CHEST 1 VIEW: CPT

## 2020-08-17 PROCEDURE — 80053 COMPREHEN METABOLIC PANEL: CPT

## 2020-08-17 PROCEDURE — 84443 ASSAY THYROID STIM HORMONE: CPT

## 2020-08-17 PROCEDURE — 82607 VITAMIN B-12: CPT

## 2020-08-17 PROCEDURE — 93017 CV STRESS TEST TRACING ONLY: CPT

## 2020-08-17 PROCEDURE — 93970 EXTREMITY STUDY: CPT

## 2020-08-17 PROCEDURE — A9502: CPT

## 2020-08-17 PROCEDURE — 82550 ASSAY OF CK (CPK): CPT

## 2020-08-17 PROCEDURE — 84100 ASSAY OF PHOSPHORUS: CPT

## 2020-08-17 PROCEDURE — 80048 BASIC METABOLIC PNL TOTAL CA: CPT

## 2020-08-17 PROCEDURE — 85027 COMPLETE CBC AUTOMATED: CPT

## 2020-08-17 PROCEDURE — 87635 SARS-COV-2 COVID-19 AMP PRB: CPT

## 2020-08-17 PROCEDURE — 93005 ELECTROCARDIOGRAM TRACING: CPT

## 2020-08-17 PROCEDURE — 78452 HT MUSCLE IMAGE SPECT MULT: CPT

## 2020-08-17 PROCEDURE — 83036 HEMOGLOBIN GLYCOSYLATED A1C: CPT

## 2020-08-17 PROCEDURE — 82306 VITAMIN D 25 HYDROXY: CPT

## 2020-08-17 RX ORDER — AMLODIPINE BESYLATE 2.5 MG/1
1 TABLET ORAL
Qty: 0 | Refills: 0 | DISCHARGE

## 2020-08-17 RX ORDER — GABAPENTIN 400 MG/1
1 CAPSULE ORAL
Qty: 0 | Refills: 0 | DISCHARGE

## 2020-08-17 RX ORDER — AMLODIPINE BESYLATE 2.5 MG/1
0.5 TABLET ORAL
Qty: 0 | Refills: 0 | DISCHARGE

## 2020-08-17 RX ORDER — LOSARTAN POTASSIUM 100 MG/1
1 TABLET, FILM COATED ORAL
Qty: 0 | Refills: 0 | DISCHARGE
Start: 2020-08-17

## 2020-08-17 RX ORDER — LOSARTAN POTASSIUM 100 MG/1
1 TABLET, FILM COATED ORAL
Qty: 0 | Refills: 0 | DISCHARGE

## 2020-08-17 RX ADMIN — Medication 81 MILLIGRAM(S): at 12:13

## 2020-08-17 RX ADMIN — LOSARTAN POTASSIUM 25 MILLIGRAM(S): 100 TABLET, FILM COATED ORAL at 05:49

## 2020-08-17 RX ADMIN — ENOXAPARIN SODIUM 40 MILLIGRAM(S): 100 INJECTION SUBCUTANEOUS at 12:13

## 2020-08-17 RX ADMIN — Medication 12.5 MILLIGRAM(S): at 05:49

## 2020-08-17 RX ADMIN — Medication 1000 UNIT(S): at 16:34

## 2020-08-17 RX ADMIN — AMLODIPINE BESYLATE 2.5 MILLIGRAM(S): 2.5 TABLET ORAL at 05:49

## 2020-08-17 NOTE — PROGRESS NOTE ADULT - SUBJECTIVE AND OBJECTIVE BOX
Patient is a 65y old  Female who presents with a chief complaint of Chest pain (16 Aug 2020 18:08)    pt seen in tele [ x ], reg med floor [   ], bed [ x ], chair at bedside [   ], a+o x3 [ x ], lethargic [  ],  nad [ x ]    no further c/o cp      Allergies    No Known Allergies        Vitals    T(F): 98.7 (08-17-20 @ 04:46), Max: 99.1 (08-16-20 @ 23:41)  HR: 60 (08-17-20 @ 04:46) (54 - 64)  BP: 126/51 (08-17-20 @ 04:46) (100/42 - 138/65)  RR: 18 (08-17-20 @ 04:46) (17 - 18)  SpO2: 99% (08-17-20 @ 04:46) (96% - 99%)  Wt(kg): --  CAPILLARY BLOOD GLUCOSE          Labs                          15.0   7.25  )-----------( 309      ( 16 Aug 2020 06:26 )             44.9       08-16    140  |  107  |  24<H>  ----------------------------<  91  4.1   |  26  |  0.82    Ca    9.3      16 Aug 2020 06:26  Phos  4.8     08-16  Mg     2.6     08-16        CARDIAC MARKERS ( 15 Aug 2020 16:57 )  <0.015 ng/mL / x     / x     / x     / x      CARDIAC MARKERS ( 15 Aug 2020 09:52 )  <0.015 ng/mL / x     / 127 U/L / x     / 2.5 ng/mL            Radiology Results      Meds    MEDICATIONS  (STANDING):  amLODIPine   Tablet 2.5 milliGRAM(s) Oral daily  aspirin enteric coated 81 milliGRAM(s) Oral daily  atorvastatin 40 milliGRAM(s) Oral at bedtime  cholecalciferol 1000 Unit(s) Oral daily  enoxaparin Injectable 40 milliGRAM(s) SubCutaneous daily  losartan 25 milliGRAM(s) Oral daily  metoprolol tartrate 12.5 milliGRAM(s) Oral two times a day      MEDICATIONS  (PRN):  acetaminophen   Tablet .. 650 milliGRAM(s) Oral every 6 hours PRN Mild Pain (1 - 3), Moderate Pain (4 - 6)      Physical Exam    Neuro :  no focal deficits  Respiratory: CTA B/L  CV: RRR, S1S2, no murmurs,   Abdominal: Soft, NT, ND +BS,  Extremities: No edema, + peripheral pulses    ASSESSMENT    Chest pain   uncontrolled htn    r/o acs,   h/o HTN,   HLD,   arthritis,   R ear hearing impaired        PLAN      cont tele,   acs protocol,   cont lopressor, aspirin, statin,   ce q8 x3 neg noted above  cardiof/u  echo with EF = 55 to 60% noted above  f/u stress test in am   bp better controlled  cont current meds Patient is a 65y old  Female who presents with a chief complaint of Chest pain (16 Aug 2020 18:08)    pt seen in tele [ x ], reg med floor [   ], bed [ x ], chair at bedside [   ], a+o x3 [ x ], lethargic [  ],  nad [ x ]    no further c/o cp      Allergies    No Known Allergies        Vitals    T(F): 98.7 (08-17-20 @ 04:46), Max: 99.1 (08-16-20 @ 23:41)  HR: 60 (08-17-20 @ 04:46) (54 - 64)  BP: 126/51 (08-17-20 @ 04:46) (100/42 - 138/65)  RR: 18 (08-17-20 @ 04:46) (17 - 18)  SpO2: 99% (08-17-20 @ 04:46) (96% - 99%)  Wt(kg): --  CAPILLARY BLOOD GLUCOSE          Labs                          15.0   7.25  )-----------( 309      ( 16 Aug 2020 06:26 )             44.9       08-16    140  |  107  |  24<H>  ----------------------------<  91  4.1   |  26  |  0.82    Ca    9.3      16 Aug 2020 06:26  Phos  4.8     08-16  Mg     2.6     08-16        CARDIAC MARKERS ( 15 Aug 2020 16:57 )  <0.015 ng/mL / x     / x     / x     / x      CARDIAC MARKERS ( 15 Aug 2020 09:52 )  <0.015 ng/mL / x     / 127 U/L / x     / 2.5 ng/mL        Radiology Results      Meds    MEDICATIONS  (STANDING):  amLODIPine   Tablet 2.5 milliGRAM(s) Oral daily  aspirin enteric coated 81 milliGRAM(s) Oral daily  atorvastatin 40 milliGRAM(s) Oral at bedtime  cholecalciferol 1000 Unit(s) Oral daily  enoxaparin Injectable 40 milliGRAM(s) SubCutaneous daily  losartan 25 milliGRAM(s) Oral daily  metoprolol tartrate 12.5 milliGRAM(s) Oral two times a day      MEDICATIONS  (PRN):  acetaminophen   Tablet .. 650 milliGRAM(s) Oral every 6 hours PRN Mild Pain (1 - 3), Moderate Pain (4 - 6)      Physical Exam    Neuro :  no focal deficits  Respiratory: CTA B/L  CV: RRR, S1S2, no murmurs,   Abdominal: Soft, NT, ND +BS,  Extremities: No edema, + peripheral pulses    ASSESSMENT    Chest pain   uncontrolled htn    r/o acs,   h/o HTN,   HLD,   arthritis,   R ear hearing impaired        PLAN      cont tele,   acs protocol,   cont lopressor, aspirin, statin,   ce q8 x3 neg noted above  cardiof/u  echo with EF = 55 to 60% noted above  f/u stress test   bp better controlled  cont current meds   d/c plan pending stress test

## 2020-08-17 NOTE — PROGRESS NOTE ADULT - SUBJECTIVE AND OBJECTIVE BOX
CHIEF COMPLAINT:Patient is a 65y old  Female who presents with a chief complaint of Chest pain .Pt appears comfortable.    	  REVIEW OF SYSTEMS:  CONSTITUTIONAL: No fever, weight loss, or fatigue  EYES: No eye pain, visual disturbances, or discharge  ENT:  No difficulty hearing, tinnitus, vertigo; No sinus or throat pain  NECK: No pain or stiffness  RESPIRATORY: No cough, wheezing, chills or hemoptysis; No Shortness of Breath  CARDIOVASCULAR: No chest pain, palpitations, passing out, dizziness, or leg swelling  GASTROINTESTINAL: No abdominal or epigastric pain. No nausea, vomiting, or hematemesis; No diarrhea or constipation. No melena or hematochezia.  GENITOURINARY: No dysuria, frequency, hematuria, or incontinence  NEUROLOGICAL: No headaches, memory loss, loss of strength, numbness, or tremors  SKIN: No itching, burning, rashes, or lesions   LYMPH Nodes: No enlarged glands  ENDOCRINE: No heat or cold intolerance; No hair loss  MUSCULOSKELETAL: No joint pain or swelling; No muscle, back, or extremity pain  PSYCHIATRIC: No depression, anxiety, mood swings, or difficulty sleeping  HEME/LYMPH: No easy bruising, or bleeding gums  ALLERGY AND IMMUNOLOGIC: No hives or eczema	        PHYSICAL EXAM:  T(C): 36.8 (08-17-20 @ 07:52), Max: 37.3 (08-16-20 @ 23:41)  HR: 57 (08-17-20 @ 07:52) (55 - 64)  BP: 107/60 (08-17-20 @ 07:52) (103/51 - 138/65)  RR: 18 (08-17-20 @ 07:52) (17 - 18)  SpO2: 98% (08-17-20 @ 07:52) (96% - 99%)        Appearance: Normal	  HEENT:   Normal oral mucosa, PERRL, EOMI	  Lymphatic: No lymphadenopathy  Cardiovascular: Normal S1 S2, No JVD, No murmurs, No edema  Respiratory: Lungs clear to auscultation	  Psychiatry: A & O x 3, Mood & affect appropriate  Gastrointestinal:  Soft, Non-tender, + BS	  Skin: No rashes, No ecchymoses, No cyanosis	  Neurologic: Non-focal  Extremities: Normal range of motion, No clubbing, cyanosis or edema  Vascular: Peripheral pulses palpable 2+ bilaterally    MEDICATIONS  (STANDING):  amLODIPine   Tablet 2.5 milliGRAM(s) Oral daily  aspirin enteric coated 81 milliGRAM(s) Oral daily  atorvastatin 40 milliGRAM(s) Oral at bedtime  cholecalciferol 1000 Unit(s) Oral daily  enoxaparin Injectable 40 milliGRAM(s) SubCutaneous daily  losartan 25 milliGRAM(s) Oral daily  metoprolol tartrate 12.5 milliGRAM(s) Oral two times a day      	  LABS:	 	      CARDIAC MARKERS ( 15 Aug 2020 16:57 )  <0.015 ng/mL / x     / x     / x     / x      CARDIAC MARKERS ( 15 Aug 2020 09:52 )  <0.015 ng/mL / x     / 127 U/L / x     / 2.5 ng/mL                                14.2   7.25  )-----------( 318      ( 17 Aug 2020 06:19 )             43.6     08-17    138  |  106  |  22<H>  ----------------------------<  94  4.0   |  26  |  0.69    Ca    8.9      17 Aug 2020 06:19  Phos  3.9     08-17  Mg     2.4     08-17        Lipid Profile: Cholesterol 181  LDL 84  HDL 62        TSH: Thyroid Stimulating Hormone, Serum: 2.20 uU/mL (08-15 @ 09:52)      	  OBSERVATIONS:  Mitral Valve: Normal mitral valve. No mitral regurgitation  is noted.  Aortic Root: Normal aortic root.  Aortic Valve: Aortic valve not well visualized. No aortic  stenosis. No aortic valve regurgitation seen.  Left Atrium: Normal left atrium.  LA volume index = 29  cc/m2.  Left Ventricle: Normal Left Ventricular Systolic Function,  (EF = 55 to 60%) Not all LV wall segments were seen. Normal  left ventricular internal dimensions and wall thicknesses.  Normal diastolic function.  Right Heart: Normal right atrium. Normal right ventricular  size and systolic function (TAPSE 2.5 cm). Normal tricuspid  valve. Trace tricuspid regurgitation. Pulmonic valve not  well seen. Trace pulmonic insufficiency is noted.  Pericardium/PleuraNo pericardial effusion.  Hemodynamic: RA Pressure is 3 mm Hg. RV systolic pressure  is normal at  31 mm Hg.

## 2020-08-17 NOTE — DISCHARGE NOTE PROVIDER - HOSPITAL COURSE
64yo Danish speaking female from home, living with son, with PMH of HTN, HLD, arthritis, R ear hearing impaird, no PSH presented to ED for chest pain and palpitation. On Fri (a day ago), pt had abd pain and not feeling well, then chest pain and palpitation (pt states " tachycardia" ) started. Pain started 1 day ago, throbbing, dull pain, 9/10 intensity, no radiation, on and off, worse with exertion. Pt also had mild headache and she feels like getting more swollen these days. She gained 10lbs in 2 months.  Pt denied SOB, acid reflex, n/v, or any other symptoms.        In ED:    EKG: NSR, low voltage QRS    Chest pain is 5/10 intensity. Pt states pain is better after getting aspirin. 64yo Syriac speaking female from home, living with son, with PMH of HTN, HLD, arthritis, R ear hearing impaird, no PSH presented to ED for chest pain and palpitation. On Fri (a day ago), pt had abd pain and not feeling well, then chest pain and palpitation (pt states " tachycardia" ) started. Pain started 1 day ago, throbbing, dull pain, 9/10 intensity, no radiation, on and off, worse with exertion. Pt also had mild headache and she feels like getting more swollen these days. She gained 10lbs in 2 months.  Pt denied SOB, acid reflex, n/v, or any other symptoms. EKG: NSR, low voltage QRS. Pt was seen by cardiology and admitted to telemetry corrales. Pt underwent echocardiogram that showed normal EF. Pt underwent Stress test that showed ********* 66yo Malay speaking female from home, living with son, with PMH of HTN, HLD, arthritis, R ear hearing impaird, no PSH presented to ED for chest pain and palpitation. On Fri (a day ago), pt had abd pain and not feeling well, then chest pain and palpitation (pt states " tachycardia" ) started. Pain started 1 day ago, throbbing, dull pain, 9/10 intensity, no radiation, on and off, worse with exertion. Pt also had mild headache and she feels like getting more swollen these days. She gained 10lbs in 2 months.  Pt denied SOB, acid reflex, n/v, or any other symptoms. EKG: NSR, low voltage QRS. Pt was seen by cardiology and admitted to telemetry corrales. Pt underwent echocardiogram that showed normal EF. Pt underwent Stress test that was negative for any reversible ischemia. You underwent echocardiogram that showed Normal Left Ventricular Systolic Function,  (EF = 55 to    60%), Normal right ventricular size and systolic function(TAPSE 2.5 cm). Pt is stable for discharge as discussed with attending on case

## 2020-08-17 NOTE — DISCHARGE NOTE NURSING/CASE MANAGEMENT/SOCIAL WORK - PATIENT PORTAL LINK FT
You can access the FollowMyHealth Patient Portal offered by [facility] by registering at the following website: [email or web address]. By joining [facility]’s FollowMyHealth portal, you will also be able to view your health information using other applications (apps) compatible with our system.

## 2020-08-17 NOTE — DISCHARGE NOTE PROVIDER - NSDCMRMEDTOKEN_GEN_ALL_CORE_FT
amLODIPine 5 mg oral tablet: 1 tab(s) orally once a day  atorvastatin 10 mg oral tablet: 1 tab(s) orally once a day  gabapentin 100 mg oral capsule: 1 cap(s) orally once a day (at bedtime)  hydroCHLOROthiazide 25 mg oral tablet: 1 tab(s) orally once a day  losartan 100 mg oral tablet: 1 tab(s) orally once a day  Vitamin D2 50,000 intl units (1.25 mg) oral capsule: 1 cap(s) orally once a week amLODIPine 5 mg oral tablet: 1 tab(s) orally once a day  atorvastatin 10 mg oral tablet: 1 tab(s) orally once a day  gabapentin 100 mg oral capsule: 1 cap(s) orally once a day (at bedtime)  hydroCHLOROthiazide 25 mg oral tablet: 1 tab(s) orally once a day  losartan 25 mg oral tablet: 1 tab(s) orally once a day  Vitamin D2 50,000 intl units (1.25 mg) oral capsule: 1 cap(s) orally once a week amLODIPine 5 mg oral tablet: 0.5 tab(s) orally once a day  atorvastatin 10 mg oral tablet: 1 tab(s) orally once a day  losartan 25 mg oral tablet: 1 tab(s) orally once a day  Vitamin D2 50,000 intl units (1.25 mg) oral capsule: 1 cap(s) orally once a week

## 2020-08-17 NOTE — DISCHARGE NOTE PROVIDER - NSDCCPCAREPLAN_GEN_ALL_CORE_FT
PRINCIPAL DISCHARGE DIAGNOSIS  Diagnosis: Chest pain, unspecified type  Assessment and Plan of Treatment: You came in with chest pain and palpitations, you underwent echocardiogram that showed normal ejection fraction. You were seen by cardiologist and underwent stress test. ****      SECONDARY DISCHARGE DIAGNOSES  Diagnosis: Hypertension, unspecified type  Assessment and Plan of Treatment: Please continue with your medications as mentioned above    Diagnosis: High cholesterol  Assessment and Plan of Treatment: Please continue with your medications as mentioned above PRINCIPAL DISCHARGE DIAGNOSIS  Diagnosis: Chest pain, unspecified type  Assessment and Plan of Treatment: You came in with chest pain and palpitations, you underwent echocardiogram that showed normal ejection fraction. You were seen by cardiologist and underwent stress test that was negative. You echocardiogram was negative for any acute changes. You are advised to follow up with your pcp in 1-2 weeks of your discharge.      SECONDARY DISCHARGE DIAGNOSES  Diagnosis: Hypertension, unspecified type  Assessment and Plan of Treatment: You have history of hypertension. You are on losartan, amlodipine and hydrochlorthiazide at home. You were maintained on cozaar 25 mg and amlodipine 2.5 mg daily. We have decreased the dose of your BP medications as your blood pressure has been on the lower side. Please monitor your blood pressure closely at home.    Diagnosis: High cholesterol  Assessment and Plan of Treatment: Please continue with your medications as mentioned above

## 2021-03-17 ENCOUNTER — EMERGENCY (EMERGENCY)
Facility: HOSPITAL | Age: 66
LOS: 1 days | Discharge: HOME | End: 2021-03-17
Attending: EMERGENCY MEDICINE
Payer: COMMERCIAL

## 2021-03-17 VITALS
HEART RATE: 78 BPM | SYSTOLIC BLOOD PRESSURE: 149 MMHG | RESPIRATION RATE: 18 BRPM | OXYGEN SATURATION: 99 % | DIASTOLIC BLOOD PRESSURE: 87 MMHG | TEMPERATURE: 97 F

## 2021-03-17 VITALS
WEIGHT: 180.78 LBS | RESPIRATION RATE: 18 BRPM | HEART RATE: 84 BPM | HEIGHT: 57 IN | DIASTOLIC BLOOD PRESSURE: 85 MMHG | OXYGEN SATURATION: 98 % | SYSTOLIC BLOOD PRESSURE: 166 MMHG | TEMPERATURE: 98 F

## 2021-03-17 PROBLEM — E78.00 PURE HYPERCHOLESTEROLEMIA, UNSPECIFIED: Chronic | Status: ACTIVE | Noted: 2020-08-15

## 2021-03-17 PROBLEM — I10 ESSENTIAL (PRIMARY) HYPERTENSION: Chronic | Status: ACTIVE | Noted: 2020-08-15

## 2021-03-17 PROBLEM — H91.90 UNSPECIFIED HEARING LOSS, UNSPECIFIED EAR: Chronic | Status: ACTIVE | Noted: 2020-08-15

## 2021-03-17 LAB
ACETONE SERPL-MCNC: NEGATIVE — SIGNIFICANT CHANGE UP
ALBUMIN SERPL ELPH-MCNC: 4 G/DL — SIGNIFICANT CHANGE UP (ref 3.5–5)
ALP SERPL-CCNC: 133 U/L — HIGH (ref 40–120)
ALT FLD-CCNC: 32 U/L DA — SIGNIFICANT CHANGE UP (ref 10–60)
ANION GAP SERPL CALC-SCNC: 7 MMOL/L — SIGNIFICANT CHANGE UP (ref 5–17)
APPEARANCE UR: CLEAR — SIGNIFICANT CHANGE UP
AST SERPL-CCNC: 41 U/L — HIGH (ref 10–40)
BASOPHILS # BLD AUTO: 0.03 K/UL — SIGNIFICANT CHANGE UP (ref 0–0.2)
BASOPHILS NFR BLD AUTO: 0.3 % — SIGNIFICANT CHANGE UP (ref 0–2)
BILIRUB SERPL-MCNC: 0.4 MG/DL — SIGNIFICANT CHANGE UP (ref 0.2–1.2)
BILIRUB UR-MCNC: NEGATIVE — SIGNIFICANT CHANGE UP
BUN SERPL-MCNC: 14 MG/DL — SIGNIFICANT CHANGE UP (ref 7–18)
CALCIUM SERPL-MCNC: 9.7 MG/DL — SIGNIFICANT CHANGE UP (ref 8.4–10.5)
CHLORIDE SERPL-SCNC: 107 MMOL/L — SIGNIFICANT CHANGE UP (ref 96–108)
CO2 SERPL-SCNC: 26 MMOL/L — SIGNIFICANT CHANGE UP (ref 22–31)
COLOR SPEC: YELLOW — SIGNIFICANT CHANGE UP
CREAT SERPL-MCNC: 0.74 MG/DL — SIGNIFICANT CHANGE UP (ref 0.5–1.3)
DIFF PNL FLD: NEGATIVE — SIGNIFICANT CHANGE UP
EOSINOPHIL # BLD AUTO: 0.01 K/UL — SIGNIFICANT CHANGE UP (ref 0–0.5)
EOSINOPHIL NFR BLD AUTO: 0.1 % — SIGNIFICANT CHANGE UP (ref 0–6)
GLUCOSE SERPL-MCNC: 99 MG/DL — SIGNIFICANT CHANGE UP (ref 70–99)
GLUCOSE UR QL: NEGATIVE — SIGNIFICANT CHANGE UP
HCT VFR BLD CALC: 44.4 % — SIGNIFICANT CHANGE UP (ref 34.5–45)
HGB BLD-MCNC: 14.9 G/DL — SIGNIFICANT CHANGE UP (ref 11.5–15.5)
IMM GRANULOCYTES NFR BLD AUTO: 0.4 % — SIGNIFICANT CHANGE UP (ref 0–1.5)
KETONES UR-MCNC: NEGATIVE — SIGNIFICANT CHANGE UP
LEUKOCYTE ESTERASE UR-ACNC: NEGATIVE — SIGNIFICANT CHANGE UP
LIDOCAIN IGE QN: 92 U/L — SIGNIFICANT CHANGE UP (ref 73–393)
LYMPHOCYTES # BLD AUTO: 1.78 K/UL — SIGNIFICANT CHANGE UP (ref 1–3.3)
LYMPHOCYTES # BLD AUTO: 18.5 % — SIGNIFICANT CHANGE UP (ref 13–44)
MAGNESIUM SERPL-MCNC: 2.6 MG/DL — SIGNIFICANT CHANGE UP (ref 1.6–2.6)
MCHC RBC-ENTMCNC: 30.3 PG — SIGNIFICANT CHANGE UP (ref 27–34)
MCHC RBC-ENTMCNC: 33.6 GM/DL — SIGNIFICANT CHANGE UP (ref 32–36)
MCV RBC AUTO: 90.4 FL — SIGNIFICANT CHANGE UP (ref 80–100)
MONOCYTES # BLD AUTO: 0.29 K/UL — SIGNIFICANT CHANGE UP (ref 0–0.9)
MONOCYTES NFR BLD AUTO: 3 % — SIGNIFICANT CHANGE UP (ref 2–14)
NEUTROPHILS # BLD AUTO: 7.45 K/UL — HIGH (ref 1.8–7.4)
NEUTROPHILS NFR BLD AUTO: 77.7 % — HIGH (ref 43–77)
NITRITE UR-MCNC: NEGATIVE — SIGNIFICANT CHANGE UP
NRBC # BLD: 0 /100 WBCS — SIGNIFICANT CHANGE UP (ref 0–0)
PH UR: 7 — SIGNIFICANT CHANGE UP (ref 5–8)
PLATELET # BLD AUTO: 347 K/UL — SIGNIFICANT CHANGE UP (ref 150–400)
POTASSIUM SERPL-MCNC: 4.5 MMOL/L — SIGNIFICANT CHANGE UP (ref 3.5–5.3)
POTASSIUM SERPL-SCNC: 4.5 MMOL/L — SIGNIFICANT CHANGE UP (ref 3.5–5.3)
PROT SERPL-MCNC: 8.4 G/DL — HIGH (ref 6–8.3)
PROT UR-MCNC: NEGATIVE — SIGNIFICANT CHANGE UP
RBC # BLD: 4.91 M/UL — SIGNIFICANT CHANGE UP (ref 3.8–5.2)
RBC # FLD: 12.5 % — SIGNIFICANT CHANGE UP (ref 10.3–14.5)
SODIUM SERPL-SCNC: 140 MMOL/L — SIGNIFICANT CHANGE UP (ref 135–145)
SP GR SPEC: 1 — LOW (ref 1.01–1.02)
TROPONIN I SERPL-MCNC: <0.015 NG/ML — SIGNIFICANT CHANGE UP (ref 0–0.04)
UROBILINOGEN FLD QL: NEGATIVE — SIGNIFICANT CHANGE UP
WBC # BLD: 9.6 K/UL — SIGNIFICANT CHANGE UP (ref 3.8–10.5)
WBC # FLD AUTO: 9.6 K/UL — SIGNIFICANT CHANGE UP (ref 3.8–10.5)

## 2021-03-17 PROCEDURE — 93010 ELECTROCARDIOGRAM REPORT: CPT

## 2021-03-17 PROCEDURE — 83735 ASSAY OF MAGNESIUM: CPT

## 2021-03-17 PROCEDURE — 99284 EMERGENCY DEPT VISIT MOD MDM: CPT | Mod: 25

## 2021-03-17 PROCEDURE — 85025 COMPLETE CBC W/AUTO DIFF WBC: CPT

## 2021-03-17 PROCEDURE — 96365 THER/PROPH/DIAG IV INF INIT: CPT

## 2021-03-17 PROCEDURE — 84484 ASSAY OF TROPONIN QUANT: CPT

## 2021-03-17 PROCEDURE — 70450 CT HEAD/BRAIN W/O DYE: CPT

## 2021-03-17 PROCEDURE — 82962 GLUCOSE BLOOD TEST: CPT

## 2021-03-17 PROCEDURE — 82009 KETONE BODYS QUAL: CPT

## 2021-03-17 PROCEDURE — 70450 CT HEAD/BRAIN W/O DYE: CPT | Mod: 26,MA

## 2021-03-17 PROCEDURE — 99285 EMERGENCY DEPT VISIT HI MDM: CPT

## 2021-03-17 PROCEDURE — 36415 COLL VENOUS BLD VENIPUNCTURE: CPT

## 2021-03-17 PROCEDURE — 80053 COMPREHEN METABOLIC PANEL: CPT

## 2021-03-17 PROCEDURE — 83690 ASSAY OF LIPASE: CPT

## 2021-03-17 PROCEDURE — 81003 URINALYSIS AUTO W/O SCOPE: CPT

## 2021-03-17 PROCEDURE — 93005 ELECTROCARDIOGRAM TRACING: CPT

## 2021-03-17 RX ORDER — METOCLOPRAMIDE HCL 10 MG
10 TABLET ORAL ONCE
Refills: 0 | Status: COMPLETED | OUTPATIENT
Start: 2021-03-17 | End: 2021-03-17

## 2021-03-17 RX ORDER — MECLIZINE HCL 12.5 MG
12.5 TABLET ORAL ONCE
Refills: 0 | Status: COMPLETED | OUTPATIENT
Start: 2021-03-17 | End: 2021-03-17

## 2021-03-17 RX ORDER — MECLIZINE HCL 12.5 MG
1 TABLET ORAL
Qty: 21 | Refills: 0
Start: 2021-03-17 | End: 2021-03-23

## 2021-03-17 RX ORDER — SODIUM CHLORIDE 9 MG/ML
1000 INJECTION INTRAMUSCULAR; INTRAVENOUS; SUBCUTANEOUS
Refills: 0 | Status: DISCONTINUED | OUTPATIENT
Start: 2021-03-17 | End: 2021-03-21

## 2021-03-17 RX ORDER — ONDANSETRON 8 MG/1
1 TABLET, FILM COATED ORAL
Qty: 21 | Refills: 0
Start: 2021-03-17 | End: 2021-03-23

## 2021-03-17 RX ADMIN — Medication 12.5 MILLIGRAM(S): at 18:57

## 2021-03-17 RX ADMIN — SODIUM CHLORIDE 125 MILLILITER(S): 9 INJECTION INTRAMUSCULAR; INTRAVENOUS; SUBCUTANEOUS at 18:58

## 2021-03-17 RX ADMIN — Medication 104 MILLIGRAM(S): at 18:58

## 2021-03-17 RX ADMIN — Medication 10 MILLIGRAM(S): at 19:28

## 2021-03-17 NOTE — ED PROVIDER NOTE - OBJECTIVE STATEMENT
. 66 y/o F pt with a PMHx of HTN, HLD and Hearing impairment and no significant PSHx BIB EMS for c/o sudden onset of dizziness vertigo at approximately 13:00. Pt also notes accompanied mild ataxia, nausea and vomiting x3. Pt denies chest pain, SOB, LOC, fever, covid exposures, recent ear infection or any other acute complaints. NKDA.  [id number] 66 y/o F pt with a PMHx of HTN, HLD and Hearing impairment and no significant PSHx BIB EMS for c/o sudden onset of dizziness, horizontal nystagmus, vertigo at approximately 13:00. Pt also notes accompanied mild ataxia, nausea and vomiting x3. Pt denies chest pain, SOB, LOC, fever, covid exposures, recent ear infection, cold or any other acute complaints. NKDA.

## 2021-03-17 NOTE — ED ADULT NURSE NOTE - OBJECTIVE STATEMENT
Patient presents to ED with c/o dizziness associated with nausea and vomiting. Patient denies pain/discomfort.

## 2021-03-17 NOTE — ED PROVIDER NOTE - PATIENT PORTAL LINK FT
You can access the FollowMyHealth Patient Portal offered by [facility] by registering at the following website: [email or web address]. By joining [employer]’s FollowMyHealth portal, you will also be able to view your health information using other applications (apps) compatible with our system.

## 2021-03-17 NOTE — ED ADULT NURSE NOTE - NSIMPLEMENTINTERV_GEN_ALL_ED
Implemented All Fall Risk Interventions:  [place] to call system. Call bell, personal items and telephone within reach. Instruct patient to call for assistance. Room bathroom lighting operational. Non-slip footwear when patient is off stretcher. Physically safe environment: no spills, clutter or unnecessary equipment. Stretcher in lowest position, wheels locked, appropriate side rails in place. Provide visual cue, wrist band, yellow gown, etc. Monitor gait and stability. Monitor for mental status changes and reorient to person, place, and time. Review medications for side effects contributing to fall risk. Reinforce activity limits and safety measures with patient and family.

## 2021-03-17 NOTE — ED PROVIDER NOTE - PROGRESS NOTE DETAILS
66 y/o F pt with a PMHx of HTN, HLD and Hearing impairment and no significant PSHx BIB EMS for c/o sudden onset of dizziness vertigo at approximately 13:00. Pt also notes accompanied mild ataxia, nausea and vomiting x3. Pt denies chest pain, SOB, LOC, fever, covid exposures, recent ear infection or any other acute complaints. NKDA. pt's feeling much better, able to ambulate with no ataxia, [place]berg-neg., will d/c home

## 2022-11-02 NOTE — ED PROVIDER NOTE - ATTESTATION, MLM
100% of the time
I have reviewed and confirmed nurses' notes for patient's medications, allergies, medical history, and surgical history.